# Patient Record
Sex: FEMALE | Race: WHITE | NOT HISPANIC OR LATINO | URBAN - METROPOLITAN AREA
[De-identification: names, ages, dates, MRNs, and addresses within clinical notes are randomized per-mention and may not be internally consistent; named-entity substitution may affect disease eponyms.]

---
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
## 2023-08-21 NOTE — IMPRESSION/PLAN
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 Savannah Layne at [facility]. Exophthalmometer today OD: 22mm OS: 24mm. Dryness and irritation have improved. Epiphora has subsided, and OS now closes fully while sleeping. Patient is doing well. Continue care with Dr. Savannah Layne.  RTC 1 year Gabapentin Pregnancy And Lactation Text: This medication is Pregnancy Category C and isn't considered safe during pregnancy. It is excreted in breast milk.